# Patient Record
Sex: MALE | Race: WHITE | Employment: UNEMPLOYED | URBAN - METROPOLITAN AREA
[De-identification: names, ages, dates, MRNs, and addresses within clinical notes are randomized per-mention and may not be internally consistent; named-entity substitution may affect disease eponyms.]

---

## 2019-05-30 ENCOUNTER — TELEPHONE (OUTPATIENT)
Dept: CARDIAC CATH/INVASIVE PROCEDURES | Age: 74
End: 2019-05-30

## 2019-05-31 ENCOUNTER — HOSPITAL ENCOUNTER (OUTPATIENT)
Dept: CARDIAC CATH/INVASIVE PROCEDURES | Age: 74
Setting detail: OBSERVATION
Discharge: HOME | DRG: 247 | End: 2019-06-01
Attending: FAMILY MEDICINE | Admitting: STUDENT IN AN ORGANIZED HEALTH CARE EDUCATION/TRAINING PROGRAM
Payer: MEDICARE

## 2019-05-31 DIAGNOSIS — I25.10 CAD IN NATIVE ARTERY: ICD-10-CM

## 2019-05-31 PROBLEM — I10 HTN (HYPERTENSION): Status: ACTIVE | Noted: 2019-05-31

## 2019-05-31 PROBLEM — Z95.5 S/P CORONARY ARTERY STENT PLACEMENT: Status: ACTIVE | Noted: 2019-05-31

## 2019-05-31 PROBLEM — Z87.891 EX-SMOKER: Status: ACTIVE | Noted: 2019-05-31

## 2019-05-31 PROBLEM — E78.5 HLD (HYPERLIPIDEMIA): Status: ACTIVE | Noted: 2019-05-31

## 2019-05-31 LAB
ABO/RH: NORMAL
ANTIBODY SCREEN: NORMAL
POC ACT LR: 241 SECONDS
POC ACT LR: 269 SECONDS

## 2019-05-31 PROCEDURE — 36415 COLL VENOUS BLD VENIPUNCTURE: CPT

## 2019-05-31 PROCEDURE — 2709999900 HC NON-CHARGEABLE SUPPLY

## 2019-05-31 PROCEDURE — C1769 GUIDE WIRE: HCPCS

## 2019-05-31 PROCEDURE — 4A023N7 MEASUREMENT OF CARDIAC SAMPLING AND PRESSURE, LEFT HEART, PERCUTANEOUS APPROACH: ICD-10-PCS | Performed by: INTERNAL MEDICINE

## 2019-05-31 PROCEDURE — C1887 CATHETER, GUIDING: HCPCS

## 2019-05-31 PROCEDURE — 027034Z DILATION OF CORONARY ARTERY, ONE ARTERY WITH DRUG-ELUTING INTRALUMINAL DEVICE, PERCUTANEOUS APPROACH: ICD-10-PCS | Performed by: INTERNAL MEDICINE

## 2019-05-31 PROCEDURE — 86901 BLOOD TYPING SEROLOGIC RH(D): CPT

## 2019-05-31 PROCEDURE — C9600 PERC DRUG-EL COR STENT SING: HCPCS | Performed by: INTERNAL MEDICINE

## 2019-05-31 PROCEDURE — 6370000000 HC RX 637 (ALT 250 FOR IP): Performed by: INTERNAL MEDICINE

## 2019-05-31 PROCEDURE — 86900 BLOOD TYPING SEROLOGIC ABO: CPT

## 2019-05-31 PROCEDURE — 85347 COAGULATION TIME ACTIVATED: CPT

## 2019-05-31 PROCEDURE — 2580000003 HC RX 258: Performed by: INTERNAL MEDICINE

## 2019-05-31 PROCEDURE — B2151ZZ FLUOROSCOPY OF LEFT HEART USING LOW OSMOLAR CONTRAST: ICD-10-PCS | Performed by: INTERNAL MEDICINE

## 2019-05-31 PROCEDURE — C1874 STENT, COATED/COV W/DEL SYS: HCPCS

## 2019-05-31 PROCEDURE — 6370000000 HC RX 637 (ALT 250 FOR IP)

## 2019-05-31 PROCEDURE — 86850 RBC ANTIBODY SCREEN: CPT

## 2019-05-31 PROCEDURE — 93454 CORONARY ARTERY ANGIO S&I: CPT | Performed by: INTERNAL MEDICINE

## 2019-05-31 PROCEDURE — 92928 PRQ TCAT PLMT NTRAC ST 1 LES: CPT | Performed by: INTERNAL MEDICINE

## 2019-05-31 PROCEDURE — 6360000002 HC RX W HCPCS

## 2019-05-31 PROCEDURE — C1894 INTRO/SHEATH, NON-LASER: HCPCS

## 2019-05-31 PROCEDURE — 2140000000 HC CCU INTERMEDIATE R&B

## 2019-05-31 PROCEDURE — 93458 L HRT ARTERY/VENTRICLE ANGIO: CPT | Performed by: INTERNAL MEDICINE

## 2019-05-31 PROCEDURE — 93005 ELECTROCARDIOGRAM TRACING: CPT | Performed by: INTERNAL MEDICINE

## 2019-05-31 PROCEDURE — C1725 CATH, TRANSLUMIN NON-LASER: HCPCS

## 2019-05-31 PROCEDURE — G0378 HOSPITAL OBSERVATION PER HR: HCPCS

## 2019-05-31 PROCEDURE — B2111ZZ FLUOROSCOPY OF MULTIPLE CORONARY ARTERIES USING LOW OSMOLAR CONTRAST: ICD-10-PCS | Performed by: INTERNAL MEDICINE

## 2019-05-31 PROCEDURE — 2500000003 HC RX 250 WO HCPCS

## 2019-05-31 RX ORDER — RANOLAZINE 500 MG/1
500 TABLET, EXTENDED RELEASE ORAL 2 TIMES DAILY
Status: DISCONTINUED | OUTPATIENT
Start: 2019-05-31 | End: 2019-06-01 | Stop reason: HOSPADM

## 2019-05-31 RX ORDER — ASPIRIN 81 MG/1
81 TABLET ORAL DAILY
COMMUNITY

## 2019-05-31 RX ORDER — CARVEDILOL 3.12 MG/1
3.12 TABLET ORAL 2 TIMES DAILY WITH MEALS
Status: DISCONTINUED | OUTPATIENT
Start: 2019-05-31 | End: 2019-06-01 | Stop reason: HOSPADM

## 2019-05-31 RX ORDER — ATORVASTATIN CALCIUM 40 MG/1
40 TABLET, FILM COATED ORAL DAILY
COMMUNITY

## 2019-05-31 RX ORDER — CARVEDILOL 3.12 MG/1
3.12 TABLET ORAL 2 TIMES DAILY WITH MEALS
COMMUNITY

## 2019-05-31 RX ORDER — ONDANSETRON 2 MG/ML
4 INJECTION INTRAMUSCULAR; INTRAVENOUS EVERY 6 HOURS PRN
Status: DISCONTINUED | OUTPATIENT
Start: 2019-05-31 | End: 2019-06-01 | Stop reason: HOSPADM

## 2019-05-31 RX ORDER — NITROGLYCERIN 0.4 MG/1
0.4 TABLET SUBLINGUAL EVERY 5 MIN PRN
Status: DISCONTINUED | OUTPATIENT
Start: 2019-05-31 | End: 2019-06-01 | Stop reason: HOSPADM

## 2019-05-31 RX ORDER — SODIUM CHLORIDE 0.9 % (FLUSH) 0.9 %
10 SYRINGE (ML) INJECTION EVERY 12 HOURS SCHEDULED
Status: DISCONTINUED | OUTPATIENT
Start: 2019-05-31 | End: 2019-06-01 | Stop reason: HOSPADM

## 2019-05-31 RX ORDER — NITROGLYCERIN 0.4 MG/1
0.4 TABLET SUBLINGUAL EVERY 5 MIN PRN
COMMUNITY

## 2019-05-31 RX ORDER — ACETAMINOPHEN 325 MG/1
650 TABLET ORAL EVERY 4 HOURS PRN
Status: DISCONTINUED | OUTPATIENT
Start: 2019-05-31 | End: 2019-06-01 | Stop reason: HOSPADM

## 2019-05-31 RX ORDER — AMLODIPINE BESYLATE 5 MG/1
5 TABLET ORAL DAILY
COMMUNITY

## 2019-05-31 RX ORDER — SODIUM CHLORIDE 0.9 % (FLUSH) 0.9 %
10 SYRINGE (ML) INJECTION PRN
Status: DISCONTINUED | OUTPATIENT
Start: 2019-05-31 | End: 2019-06-01 | Stop reason: HOSPADM

## 2019-05-31 RX ORDER — ASPIRIN 81 MG/1
81 TABLET ORAL DAILY
Status: DISCONTINUED | OUTPATIENT
Start: 2019-06-01 | End: 2019-06-01 | Stop reason: HOSPADM

## 2019-05-31 RX ORDER — SODIUM CHLORIDE 9 MG/ML
INJECTION, SOLUTION INTRAVENOUS CONTINUOUS
Status: DISCONTINUED | OUTPATIENT
Start: 2019-05-31 | End: 2019-06-01

## 2019-05-31 RX ORDER — SODIUM CHLORIDE 9 MG/ML
INJECTION, SOLUTION INTRAVENOUS ONCE
Status: COMPLETED | OUTPATIENT
Start: 2019-05-31 | End: 2019-05-31

## 2019-05-31 RX ORDER — RANOLAZINE 500 MG/1
500 TABLET, EXTENDED RELEASE ORAL 2 TIMES DAILY
COMMUNITY

## 2019-05-31 RX ORDER — ATORVASTATIN CALCIUM 40 MG/1
40 TABLET, FILM COATED ORAL DAILY
Status: DISCONTINUED | OUTPATIENT
Start: 2019-05-31 | End: 2019-06-01 | Stop reason: HOSPADM

## 2019-05-31 RX ORDER — FAMOTIDINE 20 MG/1
20 TABLET, FILM COATED ORAL 2 TIMES DAILY
Status: DISCONTINUED | OUTPATIENT
Start: 2019-05-31 | End: 2019-06-01 | Stop reason: HOSPADM

## 2019-05-31 RX ORDER — AMLODIPINE BESYLATE 5 MG/1
5 TABLET ORAL DAILY
Status: DISCONTINUED | OUTPATIENT
Start: 2019-05-31 | End: 2019-06-01 | Stop reason: HOSPADM

## 2019-05-31 RX ADMIN — RANOLAZINE 500 MG: 500 TABLET, FILM COATED, EXTENDED RELEASE ORAL at 20:23

## 2019-05-31 RX ADMIN — SODIUM CHLORIDE: 9 INJECTION, SOLUTION INTRAVENOUS at 09:35

## 2019-05-31 RX ADMIN — CARVEDILOL 3.12 MG: 3.12 TABLET, FILM COATED ORAL at 16:14

## 2019-05-31 RX ADMIN — Medication 10 ML: at 20:23

## 2019-05-31 RX ADMIN — ATORVASTATIN CALCIUM 40 MG: 40 TABLET, FILM COATED ORAL at 20:24

## 2019-05-31 RX ADMIN — FAMOTIDINE 20 MG: 20 TABLET ORAL at 20:24

## 2019-05-31 RX ADMIN — SODIUM CHLORIDE: 9 INJECTION, SOLUTION INTRAVENOUS at 09:34

## 2019-05-31 RX ADMIN — SODIUM CHLORIDE: 9 INJECTION, SOLUTION INTRAVENOUS at 14:46

## 2019-05-31 RX ADMIN — AMLODIPINE BESYLATE 5 MG: 5 TABLET ORAL at 16:14

## 2019-05-31 ASSESSMENT — PAIN SCALES - GENERAL
PAINLEVEL_OUTOF10: 0
PAINLEVEL_OUTOF10: 0

## 2019-05-31 NOTE — H&P
Hospital Medicine History & Physical      PCP: Alexandra Garcia, PARIS - CNP    Date of Admission: 5/31/2019    Date of Service: Pt seen/examined on 05/31/19  and Admitted to Inpatient with expected LOS greater than two midnights due to medical therapy. Chief Complaint:  Post cath     History Of Present Illness:  Records reviewed. This is a  68 y.o. male who presented to [address] [place] [address] with post cath. PMH of CAD with previous stent, HTN, HLD. He presented for outpatient cath today and had 2 stents placed in LAD and RCA. He was admitted to [facility] post cath. He is doing well without chest pain. Wife is at bedside. Past Medical History:          Diagnosis Date    CAD (coronary artery disease)     Hyperlipidemia     Hypertension     Stented coronary artery 2014       Past Surgical History:          Procedure Laterality Date    CORONARY ANGIOPLASTY WITH STENT PLACEMENT  05/31/2019    Dr. Radhika Hennessy- Resolute [place] JOHN 3.5x12 Prox-mid RCA, Resolute Matt JOHN 2.0x26 Diagonal    FRACTURE SURGERY         Medications Prior to Admission:      Prior to Admission medications    Medication Sig Start Date End Date Taking? Authorizing Provider   carvedilol (COREG) 3.125 MG tablet Take 3.125 mg by mouth 2 times daily (with meals)   Yes Historical Provider, MD   atorvastatin (LIPITOR) 40 MG tablet Take 40 mg by mouth daily Indications: takes a half tablet daily   Yes Historical Provider, MD   amLODIPine (NORVASC) 5 MG tablet Take 5 mg by mouth daily   Yes Historical Provider, MD   ranolazine (RANEXA) 500 MG extended release tablet Take 500 mg by mouth 2 times daily   Yes Historical Provider, MD   aspirin EC 81 MG EC tablet Take 81 mg by mouth daily   Yes Historical Provider, MD   nitroGLYCERIN (NITROSTAT) 0.4 MG SL tablet Place 0.4 mg under the tongue every 5 minutes as needed for Chest pain up to max of 3 total doses. If no relief after 1 dose, call 911.     Historical Provider, MD       Allergies:  Lisinopril and Losartan    Social History:      The patient currently lives home     TOBACCO:   reports that he has quit smoking. He has never used smokeless tobacco.  ETOH:   has no alcohol history on file. REVIEW OF SYSTEMS:   Pertinent positives as noted in the HPI. All other systems reviewed and negative. PHYSICAL EXAM:    BP (!) 143/81   Pulse 81   Temp 97.9 °F (36.6 °C) (Temporal)   Resp 18   Ht 6' (1.829 m)   Wt 215 lb (97.5 kg)   SpO2 96%   BMI 29.16 kg/m²     General appearance:  No apparent distress, appears stated age and cooperative. HEENT:  Normal cephalic, atraumatic without obvious deformity. Pupils equal, round, and reactive to light. Extra ocular muscles intact. Conjunctivae/corneas clear. Neck: Supple, with full range of motion. No jugular venous distention. Trachea midline. Respiratory:  Normal respiratory effort. Clear to auscultation, bilaterally without Rales/Wheezes/Rhonchi. Cardiovascular:  Regular rate and rhythm with normal S1/S2 without murmurs, rubs or gallops. Abdomen: Soft, non-tender, non-distended with normal bowel sounds. Musculoskeletal:  No clubbing, cyanosis or edema bilaterally. Full range of motion without deformity. Skin: Skin color, texture, turgor normal.  No rashes or lesions. Neurologic:  Neurovascularly intact without any focal sensory/motor deficits. Psychiatric:  Alert and oriented, thought content appropriate, normal insight  Capillary Refill: Brisk,< 3 seconds   Peripheral Pulses: +2 palpable, equal bilaterally       Left Heart Catheterization 05/31/19    Findings:   Left main: 30% stenosis  LAD: 50 %   Stenosis. 99% stenosis of diagonal branch  Circumflex: 50%   stenosis. RCA: Dominant. 90 %  stenosis. LV angio: not performed  Interventional procedure:   1. PCI vessel: RCA. Lesion type: B. MELVIN III flow pre PCI. Angioplasty performed with 3.0 mm balloon. Stenting performed with 3.5 mm JOHN Post dilated with 4.0 mm non compliant balloon .    Result: 0% residual stenosis and MELVIN III distal flow.      2. PCI vessel:  LAD-Diag. Lesion type C  MELVIN II flow pre PCI  Angioplasty performed with 2.0 mm balloon  Stent with 2.0 mm JOHN. Result: 0% residual stenosis with MELVIN III distal flow. Labs:     No results for input(s): WBC, HGB, HCT, PLT in the last 72 hours. No results for input(s): NA, K, CL, CO2, BUN, CREATININE, CALCIUM, PHOS in the last 72 hours. Invalid input(s): MAGNES  No results for input(s): AST, ALT, BILIDIR, BILITOT, ALKPHOS in the last 72 hours. No results for input(s): INR in the last 72 hours. No results for input(s): Floreen Pill in the last 72 hours. Urinalysis:    No results found for: Magalene Edward, BACTERIA, RBCUA, BLOODU, SPECGRAV, GLUCOSEU      ASSESSMENT:    Active Hospital Problems    Diagnosis Date Noted    CAD in native artery [I25.10] 05/31/2019    S/P coronary artery stent placement [Z95.5] 05/31/2019    HTN (hypertension) [I10] 05/31/2019    HLD (hyperlipidemia) [E78.5] 05/31/2019    Ex-smoker [Z87.891] 05/31/2019       PLAN:    Continue DAPT per cardiology   Cath recovery   Monitor tele   Monitor BP  likely discharge in am     DVT Prophylaxis: ambulate and DAPT  Diet: DIET CARDIAC;  Code Status: Full Code    PT/OT Eval Status: cardiac rehab     Dispo - tele admit with discharge in am        PARIS Shelton CNP    Thank you PARIS Patton CNP for the opportunity to be involved in this patient's care. If you have any questions or concerns please feel free to contact me at [phone]     [facility] Attending Note    Porfirio Snider is a 68 y.o.   male who was seen and examined independently by myself and, after review,  I agree with the history, physical, and assessment documented by the mid-level above. Physical exam:  Cardiovascular: heart rate normal and normal sinus rhythm  Respiratory: Lungs clear to auscultation bilaterally without wheeze, rales or rhonchi throughout all fields. The remainder of the physical exam is as dictated above. Assessment/Plan  · Agree with the plan, CAD, cardio following, continue DAPT, monitor labs and vitals and for hematoma. Dinorah Ferro MD  [employer]  Please contact me via [employer]    NOTE: This report was transcribed using voice recognition software. Every effort was made to ensure accuracy; however, inadvertent computerized transcription errors may be present.

## 2019-05-31 NOTE — PROCEDURES
[address]                  [address] [place],  [facility]                            CARDIAC CATHETERIZATION    PATIENT NAME: Lukas Mast                      :        1945  MED REC NO:   [id number]                            ROOM:       6316  ACCOUNT NO:   [de-identified]                           ADMIT DATE: 2019  PROVIDER:     Cheryl Almaraz MD    DATE OF PROCEDURE:  2019    CARDIAC CATHETERIZATION AND ANGIOPLASTY REPORT    PROCEDURES PERFORMED:  Left heart catheterization, selective coronary  angiography, left ventriculography, balloon angioplasty with deployment  of a drug-eluting coronary stent to the proximal right coronary artery  with post-stent deployment dilatation with a large noncompliant balloon  and balloon angioplasty with deployment of drug-eluting coronary stent  to the diagonal branch of the left anterior descending artery with  adjunctive heparin therapy. The procedure was done through the right radial approach. The patient received intravenous Versed and intravenous fentanyl for  sedation. INDICATION:  Worsening angina in a patient with known coronary artery  disease. PRESSURES:  Aorta 127/75 with a mean of 94. CORONARY ANGIOGRAPHY:  LEFT MAIN:  The left main artery has around 30% distal vessel narrowing  before it divided into the left anterior descending artery branch and  the left circumflex. LAD:  The left anterior descending artery is a large vessel that wraps  around the left ventricular apex. It has a stent in its proximal  segment with luminal irregularities within the stent, but with no  significant angiographic luminal narrowing with less than 30% in-stent  restenosis. At the distal edge of the stent, there is around 40% to 50%  eccentric luminal narrowing in the mid LAD.   After the stent in the  proximal to mid vessel and after the lesion at the distal edge of the  stent, the rest of the LAD did not appear to have any significant  disease. A moderate-size first diagonal branch had 99% subtotal  occlusion in its proximal third. LCX:  The left circumflex is a nondominant vessel. It gives rise to a  large first obtuse marginal branch, which itself has around 50%  narrowing in its proximal segment. RCA:  The right coronary artery is a dominant vessel. It has an  eccentric 90% proximal vessel stenosis. There are luminal  irregularities in the mid and distal vessel with less than 50%  angiographic luminal narrowings. INTERVENTIONAL PROCEDURE:  EQUIPMENTS:  1. A 6-Slovak JR-4 Medtronic Launcher guide catheter with side holes. 2.  A 0.014/300 [employer] ChoICE Extra Support J exchange wire. 3.  A 3.0 mm x 12 mm [employer] balloon. 4.  A 3.5 mm x 12 mm [place] Resolute drug-eluting coronary stent. 5.  A 4.0 mm x 8 mm [employer] noncompliant balloon. 6.  A 6-Slovak JL3.5 Medtronic Launcher guide catheter. 7.  A 2.0 x 8 mm [employer] balloon. 8.  A 2.0 mm x 26 mm [employer] [place] Resolute drug-eluting coronary  stent. TECHNIQUE:  The patient received 5000 units of intravenous heparin at  the beginning of the diagnostic procedure. An ACT prior to the  interventional procedure was 241. He was given 3000 units of heparin  prior to the beginning of the interventional procedure. A repeat ACT  was 269, during the procedure for which he was given another 2000 units  of intravenous heparin. He was given 180 mg of Brilinta to swallow at  the beginning of the interventional procedure. Intravenous Versed and  intravenous fentanyl were given for sedation. First, intervention on the right coronary artery was performed. The  right coronary artery was selectively engaged with 6-Slovak JR-4 guide  catheter with side holes.   The Extra Support exchange wire was then  advanced through the 90% stenosis in the proximal RCA and further  distant into the

## 2019-06-01 VITALS
TEMPERATURE: 98.2 F | DIASTOLIC BLOOD PRESSURE: 76 MMHG | SYSTOLIC BLOOD PRESSURE: 156 MMHG | RESPIRATION RATE: 18 BRPM | BODY MASS INDEX: 29.12 KG/M2 | OXYGEN SATURATION: 96 % | HEART RATE: 76 BPM | HEIGHT: 72 IN | WEIGHT: 215 LBS

## 2019-06-01 LAB
ANION GAP SERPL CALCULATED.3IONS-SCNC: 11 MMOL/L (ref 7–16)
BUN BLDV-MCNC: 15 MG/DL (ref 8–23)
CALCIUM SERPL-MCNC: 9.6 MG/DL (ref 8.6–10.2)
CHLORIDE BLD-SCNC: 102 MMOL/L (ref 98–107)
CHOLESTEROL, TOTAL: 162 MG/DL (ref 0–199)
CO2: 26 MMOL/L (ref 22–29)
CREAT SERPL-MCNC: 1.1 MG/DL (ref 0.7–1.2)
EKG ATRIAL RATE: 68 BPM
EKG ATRIAL RATE: 71 BPM
EKG P AXIS: 105 DEGREES
EKG P AXIS: 52 DEGREES
EKG P-R INTERVAL: 176 MS
EKG P-R INTERVAL: 176 MS
EKG Q-T INTERVAL: 410 MS
EKG Q-T INTERVAL: 412 MS
EKG QRS DURATION: 82 MS
EKG QRS DURATION: 86 MS
EKG QTC CALCULATION (BAZETT): 438 MS
EKG QTC CALCULATION (BAZETT): 445 MS
EKG R AXIS: -23 DEGREES
EKG R AXIS: -23 DEGREES
EKG T AXIS: 31 DEGREES
EKG T AXIS: 36 DEGREES
EKG VENTRICULAR RATE: 68 BPM
EKG VENTRICULAR RATE: 71 BPM
GFR AFRICAN AMERICAN: >60
GFR NON-AFRICAN AMERICAN: >60 ML/MIN/1.73
GLUCOSE BLD-MCNC: 117 MG/DL (ref 74–99)
HDLC SERPL-MCNC: 32 MG/DL
LDL CHOLESTEROL CALCULATED: 99 MG/DL (ref 0–99)
POTASSIUM REFLEX MAGNESIUM: 4.5 MMOL/L (ref 3.5–5)
SODIUM BLD-SCNC: 139 MMOL/L (ref 132–146)
TRIGL SERPL-MCNC: 153 MG/DL (ref 0–149)
VLDLC SERPL CALC-MCNC: 31 MG/DL

## 2019-06-01 PROCEDURE — 93005 ELECTROCARDIOGRAM TRACING: CPT | Performed by: INTERNAL MEDICINE

## 2019-06-01 PROCEDURE — 93010 ELECTROCARDIOGRAM REPORT: CPT | Performed by: INTERNAL MEDICINE

## 2019-06-01 PROCEDURE — 6370000000 HC RX 637 (ALT 250 FOR IP): Performed by: INTERNAL MEDICINE

## 2019-06-01 PROCEDURE — 80048 BASIC METABOLIC PNL TOTAL CA: CPT

## 2019-06-01 PROCEDURE — 80061 LIPID PANEL: CPT

## 2019-06-01 PROCEDURE — 36415 COLL VENOUS BLD VENIPUNCTURE: CPT

## 2019-06-01 PROCEDURE — 2580000003 HC RX 258: Performed by: INTERNAL MEDICINE

## 2019-06-01 PROCEDURE — G0378 HOSPITAL OBSERVATION PER HR: HCPCS

## 2019-06-01 RX ADMIN — FAMOTIDINE 20 MG: 20 TABLET ORAL at 08:34

## 2019-06-01 RX ADMIN — AMLODIPINE BESYLATE 5 MG: 5 TABLET ORAL at 08:34

## 2019-06-01 RX ADMIN — ASPIRIN 81 MG: 81 TABLET ORAL at 08:34

## 2019-06-01 RX ADMIN — RANOLAZINE 500 MG: 500 TABLET, FILM COATED, EXTENDED RELEASE ORAL at 08:34

## 2019-06-01 RX ADMIN — CARVEDILOL 3.12 MG: 3.12 TABLET, FILM COATED ORAL at 08:34

## 2019-06-01 RX ADMIN — Medication 10 ML: at 08:35

## 2019-06-01 RX ADMIN — TICAGRELOR 90 MG: 90 TABLET ORAL at 08:34

## 2019-06-01 ASSESSMENT — PAIN SCALES - GENERAL
PAINLEVEL_OUTOF10: 0
PAINLEVEL_OUTOF10: 0

## 2019-06-01 NOTE — DISCHARGE INSTR - COC
Continuity of Care Form    Patient Name: Tabatha Najera   :    MRN:  [id number]    Admit date:  2019  Discharge date:  ***    Code Status Order: Full Code   Advance Directives:   Advance Care Flowsheet Documentation     Date/Time Healthcare Directive Type of Healthcare Directive Copy in [address] Agent's Name Healthcare Agent's Phone Number    19 2802  Yes, patient has an advance directive for healthcare treatment  Durable power of  for health care;Living will  No, copy requested from family  Spouse  Mrs Jenise Bender  --    19 [phone]  Yes, patient has an advance directive for healthcare treatment  Durable power of  for health care;Living will  --  --  --  --          Admitting Physician:  Edyta Horner MD  PCP: PARIS Sewell CNP    Discharging Nurse: [facility] Unit/Room#: 6316/6316-A  Discharging Unit Phone Number: ***    Emergency Contact:   Extended Emergency Contact Information  Primary Emergency Contact: [address] [place] [address] Phone: [phone]  Relation: Spouse  Secondary Emergency Contact: Gene Loza [address] Phone: [phone]  Relation: Brother/Sister    Past Surgical History:  Past Surgical History:   Procedure Laterality Date    CORONARY ANGIOPLASTY WITH STENT PLACEMENT  2019    Dr. Radhika Hennessy- Resolute [place] JOHN 3.5x12 Prox-mid RCA, Resolute [place] JOHN 2.0x26 Diagonal    FRACTURE SURGERY         Immunization History: There is no immunization history on file for this patient.     Active Problems:  Patient Active Problem List   Diagnosis Code    CAD in native artery I25.10    S/P coronary artery stent placement Z95.5    HTN (hypertension) I10    HLD (hyperlipidemia) E78.5    Ex-smoker Z87.891       Isolation/Infection:   Isolation          No Isolation            Nurse Assessment:  Last Vital Signs: BP (!) 154/76   Pulse 74   Temp 98.1 °F (36.7 °C) (Temporal)   Resp 14   Ht 6' (1.829 m)   Wt 215 lb (97.5 kg)   SpO2 97%   BMI 29.16 kg/m²     Last documented pain score (0-10 scale): Pain Level: 0  Last Weight:   Wt Readings from Last 1 Encounters:   19 215 lb (97.5 kg)     Mental Status:  {IP PT MENTAL STATUS:21502}    IV Access:  { GREG IV ACCESS:280553733}    Nursing Mobility/ADLs:  Walking   {CHP DME DXAN:870202825}  Transfer  {CHP DME TIAN:259805484}  Bathing  {CHP DME JTZA:548967278}  Dressing  {CHP DME SNSL:904562869}  Toileting  {CHP DME KBBO:371685206}  Feeding  {CHP DME XWWR:153459765}  Med Admin  {CHP DME ZAAW:402831010}  Med Delivery   { GREG MED Delivery:666303998}    Wound Care Documentation and Therapy:        Elimination:  Continence:   · Bowel: {YES / AP:69686}  · Bladder: {YES / FP:17502}  Urinary Catheter: {Urinary Catheter:398323303}   Colostomy/Ileostomy/Ileal Conduit: {YES / VD:00037}       Date of Last BM: ***    Intake/Output Summary (Last 24 hours) at 2019 0128  Last data filed at 2019 2251  Gross per 24 hour   Intake  ml   Output 625 ml   Net 1397 ml     I/O last 3 completed shifts:   In:  [P.O.:1080; I.V.:942]  Out: 625 [Urine:625]    Safety Concerns:     [employer] Safety Concerns:342501368}    Impairments/Disabilities:      [employer] Impairments/Disabilities:006588162}    Nutrition Therapy:  Current Nutrition Therapy:   [employer] Diet List:712791851}    Routes of Feeding: {P DME Other Feedings:723826064}  Liquids: {Slp liquid thickness:79015}  Daily Fluid Restriction: {CHP DME Yes amt example:923850413}  Last Modified Barium Swallow with Video (Video Swallowing Test): {Done Not Done BCTE:360134577}    Treatments at the Time of Hospital Discharge:   Respiratory Treatments: ***  Oxygen Therapy:  {Therapy; copd oxygen:71801}  Ventilator:    {HARDY PAT Vent ZVSO:227913978}    Rehab Therapies: {THERAPEUTIC INTERVENTION:6713140781}  Weight Bearing Status/Restrictions: {HARDY PAT Weight Bearin}  Other Medical Equipment (for information only, NOT a DME order):  {EQUIPMENT:802887104}  Other Treatments: ***    Patient's personal belongings (please select all that are sent with patient):  {CHP DME Belongings:318469523}    RN SIGNATURE:  {Esignature:355180672}    CASE MANAGEMENT/SOCIAL WORK SECTION    Inpatient Status Date: ***    Readmission Risk Assessment Score:  Readmission Risk              Risk of Unplanned Readmission:        7           Discharging to Facility/ Agency   · Name:   · Address:  · Phone:  · Fax:    Dialysis Facility (if applicable)   · Name:  · Address:  · Dialysis Schedule:  · Phone:  · Fax:    / signature: {Esignature:716780523}    PHYSICIAN SECTION    Prognosis: {Prognosis:2585022212}    Condition at Discharge: [address] [place] [address] Patient Condition:295398408}    Rehab Potential (if transferring to Rehab): {Prognosis:0927122828}    Recommended Labs or Other Treatments After Discharge: ***    Physician Certification: I certify the above information and transfer of Jin Gonzales  is necessary for the continuing treatment of the diagnosis listed and that he requires {Admit to Appropriate Level of Care:40563} for {GREATER/LESS:470104797} 30 days.      Update Admission H&P: {CHP DME Changes in FAHEB:559825410}    PHYSICIAN SIGNATURE:  {Esignature:294551512}

## 2019-06-01 NOTE — DISCHARGE INSTR - DIET
how often you eat them, but do not cut them out entirely. · Eat a wide variety of foods. Make healthy choices when eating out  · The type of restaurant you choose can help you make healthy choices. Even fast-food chains are now offering more low-fat or healthier choices on the menu. · Choose smaller portions, or take half of your meal home. · When eating out, try:  ? A veggie pizza with a whole wheat crust or grilled chicken (instead of sausage or pepperoni). ? Pasta with roasted vegetables, grilled chicken, or marinara sauce instead of cream sauce. ? A vegetable wrap or grilled chicken wrap. ? Broiled or poached food instead of fried or breaded items. Make healthy choices easy  · Buy packaged, prewashed, ready-to-eat fresh vegetables and fruits, such as baby carrots, salad mixes, and chopped or shredded broccoli and cauliflower. · Buy packaged, presliced fruits, such as melon or pineapple. · Choose 100% fruit or vegetable juice instead of soda. Limit juice intake to 4 to 6 oz (½ to ¾ cup) a day. · Blend low-fat yogurt, fruit juice, and canned or frozen fruit to make a smoothie for breakfast or a snack. Where can you learn more? Go to https://[employer] org and sign in to your Mobile Experience account. Enter I925 in the [facility] box to learn more about \"Eating Healthy Foods: Care Instructions. \"     If you do not have an account, please click on the \"Sign Up Now\" link. Current as of: November 7, 2018  Content Version: 12.0  © 1701-2811 Healthwise, Incorporated. Care instructions adapted under license by [facility] ([facility]). If you have questions about a medical condition or this instruction, always ask your healthcare professional. [address] any warranty or liability for your use of this information.

## 2019-06-01 NOTE — PLAN OF CARE
Question as to pain, location, radiation and relief after analgesic given. Ask patient to rate pain on a scale of 0-10. Have patient describe where the pain is and how intense it is, does it radiate and does the pain medication prescribed work? Keep wound / puncture site clean and dry. Remove dressing within 24 hours if a groin site and the next am if a chest site or right radial wrist site. Keep open to air, or cover with Band-Aid until healed on a daily basis. Note any drainage, type, color, odor  Check temperature for fever. Talked about cleaning with dial soap, gently, pat dry, air dry -   Watch for signs of infection, call  If one is suspected.

## 2019-06-01 NOTE — PROGRESS NOTES
CARDIOLOGY PROGRESS NOTE  [facility]        Subjective:  [facility] cardiology seeing patient for CAD status post stent to the RCA and diagonal yesterday with prior history of LAD stent 5 years ago, chronic hypertension, hyperlipidemia and right bundle branch block. He denies any chest pain shortness of breath or distress overnight. No heart failure symptoms. His wife is at bedside throughout the interview. He denies any problems from the right wrist heart catheterization site. Objective: Labs chart medications and telemetry all reviewed. Patient Vitals for the past 24 hrs:   BP Temp Temp src Pulse Resp SpO2   06/01/19 0830 (!) 156/76 98.2 °F (36.8 °C) Temporal 76 18 96 %   06/01/19 0400 125/72 98.1 °F (36.7 °C) Temporal 72 16 --   05/31/19 2330 (!) 154/76 98.1 °F (36.7 °C) Temporal 74 14 --   05/31/19 2000 127/79 98.4 °F (36.9 °C) Temporal 84 14 97 %   05/31/19 1549 (!) 143/81 97.9 °F (36.6 °C) Temporal 81 18 96 %   05/31/19 1245 (!) 142/76 97.6 °F (36.4 °C) Temporal 68 18 96 %         Intake/Output Summary (Last 24 hours) at 6/1/2019 1145  Last data filed at 6/1/2019 0302  Gross per 24 hour   Intake 2022 ml   Output 1075 ml   Net 947 ml       Wt Readings from Last 3 Encounters:   05/31/19 215 lb (97.5 kg)       Telemetry: I personally reviewed and shows normal sinus rhythm heart rate in the 60s and 70s. Current meds: Scheduled Meds:   amLODIPine  5 mg Oral Daily    aspirin EC  81 mg Oral Daily    atorvastatin  40 mg Oral Daily    carvedilol  3.125 mg Oral BID WC    ranolazine  500 mg Oral BID    sodium chloride flush  10 mL Intravenous 2 times per day    famotidine  20 mg Oral BID    ticagrelor  90 mg Oral BID     Continuous Infusions:  PRN Meds:.nitroGLYCERIN, sodium chloride flush, acetaminophen, magnesium hydroxide, ondansetron    Allergies: Lisinopril and Losartan      Labs: No results for input(s): WBC, HGB, HCT, MCV, PLT in the last 72 hours.     Labs:   Recent Labs 06/01/19  0436      K 4.5      CO2 26   BUN 15   CREATININE 1.1       Labs: No results for input(s): CKTOTAL, CKMB, CKMBINDEX, TROPONINI in the last 72 hours. Labs: No results for input(s): BNP in the last 72 hours. Labs:   Recent Labs     06/01/19 0436   CHOL 162   HDL 32   TRIG 153*       Labs: No results for input(s): PROT, INR in the last 72 hours. Review of systems: No reported significant weight gain or weight loss. no dysuria or frequency, no dizziness, falls or trauma, no change in bowel or bladder habits, no hematochezia, hemoptysis or hematuria. No fevers, chills, nausea or vomiting reported. No significant wheezing or sputum production. No headache or visual changes. The remainder of the 10 review of systems otherwise negative. Exam      General: Patient comfortable in no distress and currently denies any chest pain. HEENT: Face symmetrical and no apparent cranial nerve deficit. Neck: No jugular venous distention, carotid bruit or thyromegaly. Lungs: Clear bilaterally without rales, wheezes or dullness. Cardiac: Regular rate and rhythm, no S3, S4, no rub or gallop. Abdomen: No rebound or guarding, no hepatosplenomegaly. Extremities: Without significant clubbing , cyanosis, or edema. Neuro:  No focal motor or sensory deficit apparent. Skin: No petechiae, no significant bruising. Assessment: See plan below        Plan: #1 CAD with recently progressive anginal symptoms. Medical management and dual antiplatelet therapy with Brilinta 90 mg twice a day to his medical regimen, carvedilol statin aspirin amlodipine. Post cath blood work this morning shows creatinine 1.1. Plan home this afternoon and follow-up my office one month [facility] cardiology. #2 history of prior LAD stent 5 years ago. Continue to modify cardiovascular risk factors. #3 hypertension and blood pressure reasonable on current medical regimen.     #4 hyperlipidemia and continue atorvastatin 40 mg daily. Today lipid profile total cholesterol 162, HDL 32, LDL 99 and triglycerides 153. Discussed following a low-sodium low-fat low carbohydrate diet. Weight loss and exercise regimen and encouraged him to come to cardiac rehab. Home this afternoon and follow-up my office in one month.         Electronically signed by Cecilia Chakraborty MD on 6/1/2019 at 11:45 AM

## 2019-06-01 NOTE — DISCHARGE SUMMARY
Hospital Medicine Discharge Summary    Patient ID: Gato Mchugh      Patient's PCP: PARIS Wright CNP    Admit Date: 5/31/2019     Discharge Date:  06/01/19     Admitting Physician: Tonya Rosario MD     Discharge Physician: PARIS Norris CNP     Discharge Diagnoses: Active Hospital Problems    Diagnosis Date Noted    CAD in native artery [I25.10] 05/31/2019    S/P coronary artery stent placement [Z95.5] 05/31/2019    HTN (hypertension) [I10] 05/31/2019    HLD (hyperlipidemia) [E78.5] 05/31/2019    Ex-smoker [Q20.253] 05/31/2019       The patient was seen and examined on day of discharge and this discharge summary is in conjunction with any daily progress note from day of discharge. Hospital Course: This is a  68 y.o. male who presented to [address] [place] [address] with post cath. PMH of CAD with previous stent, HTN, HLD. He presented for outpatient cath today and had 2 stents placed in LAD and RCA. He was admitted to [facility] post cath, well over night without issues. Discharged home in stable condition. Exam:     BP (!) 156/76   Pulse 76   Temp 98.2 °F (36.8 °C) (Temporal)   Resp 18   Ht 6' (1.829 m)   Wt 215 lb (97.5 kg)   SpO2 96%   BMI 29.16 kg/m²     General appearance: No apparent distress, appears stated age and cooperative. HEENT: Pupils equal, round, and reactive to light. Conjunctivae/corneas clear. Neck: Supple, with full range of motion. No jugular venous distention. Trachea midline. Respiratory:  Normal respiratory effort. Clear to auscultation, bilaterally without Rales/Wheezes/Rhonchi. Cardiovascular: Regular rate and rhythm with normal S1/S2 without murmurs, rubs or gallops. Abdomen: Soft, non-tender, non-distended with normal bowel sounds. Musculoskeletal: No clubbing, cyanosis or edema bilaterally. Full range of motion without deformity. Skin: Skin color, texture, turgor normal.  No rashes or lesions.   Neurologic:  Neurovascularly intact without any focal sensory/motor deficits. Psychiatric: Alert and oriented, thought content appropriate, normal insight      Consults:     IP CONSULT TO CARDIAC REHAB    Significant Diagnostic Studies:       Procedure:   Left Heart Catheterization, coronary angiography, left ventriculography, percutaneous coronary intervention to RCA and LAD-Diagonal branch    Findings:   Left main: 30% stenosis  LAD: 50 %   Stenosis. 99% stenosis of diagonal branch  Circumflex: 50%   stenosis. RCA: Dominant. 90 %  stenosis. LV angio: not performed  Interventional procedure:   1. PCI vessel: RCA. Lesion type: B. MELVIN III flow pre PCI. Angioplasty performed with 3.0 mm balloon. Stenting performed with 3.5 mm JOHN Post dilated with 4.0 mm non compliant balloon . Result: 0% residual stenosis and MELVIN III distal flow.    2. PCI vessel:  LAD-Diag. Lesion type C  MELVIN II flow pre PCI  Angioplasty performed with 2.0 mm balloon  Stent with 2.0 mm JOHN. Result: 0% residual stenosis with MELVIN III distal flow. Disposition:  Home     Discharge Instructions/Follow-up:    Continue DAPT  Follow up with PCP and cardiology    Code Status:  Full Code     Activity: activity as tolerated    Diet: cardiac diet    Labs:  For convenience and continuity at follow-up the following most recent labs are provided:      CBC:  No results found for: WBC, HGB, HCT, PLT    Renal:    Lab Results   Component Value Date     06/01/2019    K 4.5 06/01/2019     06/01/2019    CO2 26 06/01/2019    BUN 15 06/01/2019    CREATININE 1.1 06/01/2019    CALCIUM 9.6 06/01/2019       Discharge Medications:     Current Discharge Medication List           Details   ticagrelor (BRILINTA) 90 MG TABS tablet Take 1 tablet by mouth 2 times daily  Qty: 180 tablet, Refills: 2              Details   carvedilol (COREG) 3.125 MG tablet Take 3.125 mg by mouth 2 times daily (with meals)      atorvastatin (LIPITOR) 40 MG tablet Take 40 mg by mouth daily Indications: takes a half tablet daily      amLODIPine (NORVASC) 5 MG tablet Take 5 mg by mouth daily      ranolazine (RANEXA) 500 MG extended release tablet Take 500 mg by mouth 2 times daily      aspirin EC 81 MG EC tablet Take 81 mg by mouth daily      nitroGLYCERIN (NITROSTAT) 0.4 MG SL tablet Place 0.4 mg under the tongue every 5 minutes as needed for Chest pain up to max of 3 total doses. If no relief after 1 dose, call 911. Time Spent on discharge is more than 45 minutes in the examination, evaluation, counseling and review of medications and discharge plan. Signed:    PARIS Sanon - CNP   6/1/2019      Thank you PARIS Patton - BRIAN for the opportunity to be involved in this patient's care. If you have any questions or concerns please feel free to contact me at [phone].

## 2019-06-01 NOTE — PROGRESS NOTES
CLINICAL PHARMACY NOTE: MEDS TO [address] Select Patient?: No  Total # of Prescriptions Filled: 1   The following medications were delivered to the patient:  · brilinta 90  Total # of Interventions Completed: 3  Time Spent (min): 30    Additional Documentation:  ,

## 2023-01-10 ENCOUNTER — OFFICE VISIT (OUTPATIENT)
Dept: FAMILY MEDICINE CLINIC | Age: 78
End: 2023-01-10
Payer: MEDICARE

## 2023-01-10 VITALS
OXYGEN SATURATION: 96 % | SYSTOLIC BLOOD PRESSURE: 128 MMHG | WEIGHT: 215 LBS | DIASTOLIC BLOOD PRESSURE: 80 MMHG | RESPIRATION RATE: 20 BRPM | BODY MASS INDEX: 29.12 KG/M2 | TEMPERATURE: 97.4 F | HEIGHT: 72 IN | HEART RATE: 75 BPM

## 2023-01-10 DIAGNOSIS — J01.90 ACUTE NON-RECURRENT SINUSITIS, UNSPECIFIED LOCATION: Primary | ICD-10-CM

## 2023-01-10 PROCEDURE — 1123F ACP DISCUSS/DSCN MKR DOCD: CPT | Performed by: PHYSICIAN ASSISTANT

## 2023-01-10 PROCEDURE — 3074F SYST BP LT 130 MM HG: CPT | Performed by: PHYSICIAN ASSISTANT

## 2023-01-10 PROCEDURE — 3079F DIAST BP 80-89 MM HG: CPT | Performed by: PHYSICIAN ASSISTANT

## 2023-01-10 PROCEDURE — 99203 OFFICE O/P NEW LOW 30 MIN: CPT | Performed by: PHYSICIAN ASSISTANT

## 2023-01-10 RX ORDER — CARVEDILOL PHOSPHATE 40 MG/1
CAPSULE, EXTENDED RELEASE ORAL
COMMUNITY

## 2023-01-10 RX ORDER — HYDRALAZINE HYDROCHLORIDE 25 MG/1
TABLET, FILM COATED ORAL
COMMUNITY
Start: 2022-11-07

## 2023-01-10 RX ORDER — ATORVASTATIN CALCIUM 20 MG/1
TABLET, FILM COATED ORAL
COMMUNITY
Start: 2012-11-09

## 2023-01-10 RX ORDER — METHYLPREDNISOLONE 4 MG/1
TABLET ORAL
Qty: 1 KIT | Refills: 0 | Status: SHIPPED | OUTPATIENT
Start: 2023-01-10

## 2023-01-10 RX ORDER — CEFDINIR 300 MG/1
300 CAPSULE ORAL 2 TIMES DAILY
Qty: 20 CAPSULE | Refills: 0 | Status: SHIPPED | OUTPATIENT
Start: 2023-01-10 | End: 2023-01-20

## 2023-01-10 RX ORDER — AMLODIPINE BESYLATE 2.5 MG/1
TABLET ORAL
COMMUNITY

## 2023-01-10 RX ORDER — LEVOFLOXACIN 500 MG/1
500 TABLET, FILM COATED ORAL DAILY
Qty: 10 TABLET | Refills: 0 | Status: SHIPPED | OUTPATIENT
Start: 2023-01-10 | End: 2023-01-20

## 2023-01-10 ASSESSMENT — ENCOUNTER SYMPTOMS
SHORTNESS OF BREATH: 0
VOMITING: 0
PHOTOPHOBIA: 0
COUGH: 0
ABDOMINAL PAIN: 0
BACK PAIN: 0
DIARRHEA: 0
NAUSEA: 0
SINUS PRESSURE: 1
SORE THROAT: 0
SINUS PAIN: 1

## 2023-01-10 NOTE — PROGRESS NOTES
1/10/23  Betsy Rogers : 1945 Sex: male  Age 68 y.o. Subjective:  Chief Complaint   Patient presents with    Sinus Problem         27-year-old male with a history of CAD and hypertension presents to the walk-in clinic for evaluation of body aches, fatigue and chills that he states has been ongoing for 7-10 days. He also notes sinus congestion with pressure and pain. He has a little drainage. He denies cough. No fever, nausea or vomiting. He states his appetite is normal.  No shortness of breath or chest pain. He is taking over-the-counter Claritin. He states he walks at least 30 minutes every day. He states he has needed a nap but after 20 minutes feels recharged. Review of Systems   Constitutional:  Positive for chills and fatigue. Negative for fever. HENT:  Positive for congestion, sinus pressure and sinus pain. Negative for ear pain and sore throat. Eyes:  Negative for photophobia and visual disturbance. Respiratory:  Negative for cough and shortness of breath. Cardiovascular:  Negative for chest pain. Gastrointestinal:  Negative for abdominal pain, diarrhea, nausea and vomiting. Genitourinary:  Negative for difficulty urinating, dysuria, frequency and urgency. Musculoskeletal:  Positive for myalgias. Negative for back pain, neck pain and neck stiffness. Skin:  Negative for rash. Neurological:  Negative for dizziness, syncope, weakness, light-headedness and headaches. Hematological:  Negative for adenopathy. Does not bruise/bleed easily. Psychiatric/Behavioral:  Negative for agitation and confusion. All other systems reviewed and are negative.       PMH:     Past Medical History:   Diagnosis Date    CAD (coronary artery disease)     Hyperlipidemia     Hypertension     Stented coronary artery        Past Surgical History:   Procedure Laterality Date    CORONARY ANGIOPLASTY WITH STENT PLACEMENT  2019    Dr. Epperson Levo- Resolute [place] JOHN 3.5x12 Prox-mid RCA, Resolute [place] JOHN 2.0x26 Diagonal    FRACTURE SURGERY         History reviewed. No pertinent family history. Medications:     Current Outpatient Medications:     amLODIPine (NORVASC) 2.5 MG tablet, Take by mouth, Disp: , Rfl:     atorvastatin (LIPITOR) 20 MG tablet, Take by mouth, Disp: , Rfl:     hydrALAZINE (APRESOLINE) 25 MG tablet, TAKE 1 TABLET BY MOUTH TWICE DAILY, Disp: , Rfl:     carvedilol (COREG CR) 40 MG CP24 extended release capsule, , Disp: , Rfl:     cefdinir (OMNICEF) 300 MG capsule, Take 1 capsule by mouth 2 times daily for 10 days, Disp: 20 capsule, Rfl: 0    methylPREDNISolone (MEDROL DOSEPACK) 4 MG tablet, Take by mouth., Disp: 1 kit, Rfl: 0    ticagrelor (BRILINTA) 90 MG TABS tablet, Take 1 tablet by mouth 2 times daily, Disp: 180 tablet, Rfl: 2    carvedilol (COREG) 3.125 MG tablet, Take 3.125 mg by mouth 2 times daily (with meals), Disp: , Rfl:     atorvastatin (LIPITOR) 40 MG tablet, Take 40 mg by mouth daily Indications: takes a half tablet daily, Disp: , Rfl:     amLODIPine (NORVASC) 5 MG tablet, Take 5 mg by mouth daily, Disp: , Rfl:     ranolazine (RANEXA) 500 MG extended release tablet, Take 500 mg by mouth 2 times daily, Disp: , Rfl:     aspirin EC 81 MG EC tablet, Take 81 mg by mouth daily, Disp: , Rfl:     nitroGLYCERIN (NITROSTAT) 0.4 MG SL tablet, Place 0.4 mg under the tongue every 5 minutes as needed for Chest pain up to max of 3 total doses. If no relief after 1 dose, call 911., Disp: , Rfl:     Allergies: Allergies   Allergen Reactions    Amoxicillin-Pot Clavulanate      GI upset    Doxycycline Hyclate Other (See Comments)     Double vision    Lisinopril     Losartan        Social History:     Social History     Tobacco Use    Smoking status: Former    Smokeless tobacco: Never   Substance Use Topics    Drug use: Never       Patient lives at home.     Physical Exam:     Vitals:    01/10/23 0901   BP: 128/80   Pulse: 75   Resp: 20   Temp: 97.4 °F (36.3 °C)   TempSrc: Temporal   SpO2: 96%   Weight: 215 lb (97.5 kg)   Height: 6' (1.829 m)       Exam:  Physical Exam  Vitals and nursing note reviewed. Constitutional:       General: He is not in acute distress. Appearance: He is well-developed. HENT:      Head: Normocephalic and atraumatic. Comments: Patient has tenderness palpation of the frontal sinus. Right Ear: Tympanic membrane normal.      Left Ear: Tympanic membrane normal.      Nose: Nose normal.      Mouth/Throat:      Mouth: Mucous membranes are moist.      Pharynx: Oropharynx is clear. Eyes:      Conjunctiva/sclera: Conjunctivae normal.      Pupils: Pupils are equal, round, and reactive to light. Cardiovascular:      Rate and Rhythm: Normal rate and regular rhythm. Pulmonary:      Effort: Pulmonary effort is normal. No respiratory distress. Breath sounds: Normal breath sounds. Abdominal:      General: Bowel sounds are normal.      Palpations: Abdomen is soft. Tenderness: There is no abdominal tenderness. Musculoskeletal:         General: Normal range of motion. Cervical back: Normal range of motion. No rigidity. Lymphadenopathy:      Cervical: No cervical adenopathy. Skin:     General: Skin is warm and dry. Neurological:      General: No focal deficit present. Mental Status: He is alert and oriented to person, place, and time. Mental status is at baseline. Psychiatric:         Mood and Affect: Mood normal.         Behavior: Behavior normal.         Thought Content: Thought content normal.         Judgment: Judgment normal.         Testing:           Medical Decision Making:     Patient upon arrival did not appear toxic or lethargic. Vital signs were reviewed. Past medical history reviewed. Allergies reviewed. Medications reviewed. Patient is presenting with the above complaint of body aches, fatigue and sinus congestion. Differential diagnosis was discussed with the patient.   Patient will be given a prescription for cefdinir and Medrol Dosepak. Patient may use over-the-counter analgesics and decongestants as needed. Patient is continue to monitor symptoms and follow-up with their primary care provider in 3-5 days if no improvement. Patient will return or go to the emergency department for any worsening symptoms. Patient understands the plan is agreeable. Addendum: The pharmacy states they do not have cefdinir and it is on backorder. Given his list of allergies we will choose Levaquin in its place. The prescription was sent to his pharmacy. Clinical Impression:   Constance Rico was seen today for sinus problem. Diagnoses and all orders for this visit:    Acute non-recurrent sinusitis, unspecified location    Other orders  -     cefdinir (OMNICEF) 300 MG capsule; Take 1 capsule by mouth 2 times daily for 10 days  -     methylPREDNISolone (MEDROL DOSEPACK) 4 MG tablet; Take by mouth. The patient is to call for any concerns or return if any of the signs or symptoms worsen. The patient is to follow-up with PCP in the next 2-3 days for repeat evaluation repeat assessment or go directly to the emergency department. SIGNATURE: Melody Gaytan PA-C

## 2023-04-06 ENCOUNTER — OFFICE VISIT (OUTPATIENT)
Dept: FAMILY MEDICINE CLINIC | Age: 78
End: 2023-04-06
Payer: MEDICARE

## 2023-04-06 VITALS
OXYGEN SATURATION: 95 % | DIASTOLIC BLOOD PRESSURE: 76 MMHG | SYSTOLIC BLOOD PRESSURE: 122 MMHG | HEIGHT: 72 IN | BODY MASS INDEX: 29.66 KG/M2 | HEART RATE: 79 BPM | RESPIRATION RATE: 18 BRPM | TEMPERATURE: 97.8 F | WEIGHT: 219 LBS

## 2023-04-06 DIAGNOSIS — J01.10 ACUTE NON-RECURRENT FRONTAL SINUSITIS: Primary | ICD-10-CM

## 2023-04-06 PROCEDURE — 3074F SYST BP LT 130 MM HG: CPT

## 2023-04-06 PROCEDURE — 1123F ACP DISCUSS/DSCN MKR DOCD: CPT

## 2023-04-06 PROCEDURE — 99213 OFFICE O/P EST LOW 20 MIN: CPT

## 2023-04-06 PROCEDURE — 3078F DIAST BP <80 MM HG: CPT

## 2023-04-06 RX ORDER — CEFDINIR 300 MG/1
300 CAPSULE ORAL 2 TIMES DAILY
Qty: 20 CAPSULE | Refills: 0 | Status: SHIPPED | OUTPATIENT
Start: 2023-04-06 | End: 2023-04-16

## 2023-04-06 NOTE — PROGRESS NOTES
effects discussed. Follow-up with PCP in 7 to 10 days. Red flag symptoms were discussed with the patient including high or refractory fever, progressive SOB, dyspnea, CP, calf pain/swelling, shaking chills, vomiting, abdominal pain, lethargy, flank pain, or decreased urinary output. If any of these occur, they should go immediately to the emergency department for further evaluation. All questions were answered. The patient relies understanding and was agreeable to the above plan. Pedrito Chester, APRN - CNP    *NOTE: This report was transcribed using voice recognition software. Every effort was made to ensure accuracy; however, inadvertent computerized transcription errors may be present.

## 2023-08-10 ENCOUNTER — OFFICE VISIT (OUTPATIENT)
Dept: FAMILY MEDICINE CLINIC | Age: 78
End: 2023-08-10
Payer: MEDICARE

## 2023-08-10 VITALS
OXYGEN SATURATION: 96 % | HEIGHT: 72 IN | HEART RATE: 82 BPM | WEIGHT: 219 LBS | SYSTOLIC BLOOD PRESSURE: 132 MMHG | BODY MASS INDEX: 29.66 KG/M2 | TEMPERATURE: 97.8 F | DIASTOLIC BLOOD PRESSURE: 76 MMHG | RESPIRATION RATE: 18 BRPM

## 2023-08-10 DIAGNOSIS — J01.90 ACUTE NON-RECURRENT SINUSITIS, UNSPECIFIED LOCATION: Primary | ICD-10-CM

## 2023-08-10 PROCEDURE — 3078F DIAST BP <80 MM HG: CPT | Performed by: NURSE PRACTITIONER

## 2023-08-10 PROCEDURE — 99213 OFFICE O/P EST LOW 20 MIN: CPT | Performed by: NURSE PRACTITIONER

## 2023-08-10 PROCEDURE — 3075F SYST BP GE 130 - 139MM HG: CPT | Performed by: NURSE PRACTITIONER

## 2023-08-10 PROCEDURE — 1123F ACP DISCUSS/DSCN MKR DOCD: CPT | Performed by: NURSE PRACTITIONER

## 2023-08-10 RX ORDER — CEFDINIR 300 MG/1
300 CAPSULE ORAL 2 TIMES DAILY
Qty: 20 CAPSULE | Refills: 0 | Status: SHIPPED | OUTPATIENT
Start: 2023-08-10 | End: 2023-08-20

## 2025-09-06 ENCOUNTER — OFFICE VISIT (OUTPATIENT)
Dept: FAMILY MEDICINE CLINIC | Age: 80
End: 2025-09-06
Payer: MEDICARE

## 2025-09-06 VITALS
TEMPERATURE: 97.8 F | OXYGEN SATURATION: 97 % | DIASTOLIC BLOOD PRESSURE: 80 MMHG | WEIGHT: 222 LBS | SYSTOLIC BLOOD PRESSURE: 142 MMHG | HEIGHT: 72 IN | BODY MASS INDEX: 30.07 KG/M2 | HEART RATE: 83 BPM

## 2025-09-06 DIAGNOSIS — B96.89 ACUTE BACTERIAL SINUSITIS: Primary | ICD-10-CM

## 2025-09-06 DIAGNOSIS — J01.90 ACUTE BACTERIAL SINUSITIS: Primary | ICD-10-CM

## 2025-09-06 DIAGNOSIS — R68.83 CHILLS: ICD-10-CM

## 2025-09-06 DIAGNOSIS — R51.9 NONINTRACTABLE HEADACHE, UNSPECIFIED CHRONICITY PATTERN, UNSPECIFIED HEADACHE TYPE: ICD-10-CM

## 2025-09-06 LAB
Lab: NORMAL
PERFORMING INSTRUMENT: NORMAL
QC PASS/FAIL: NORMAL
SARS-COV-2, POC: NORMAL

## 2025-09-06 PROCEDURE — 99213 OFFICE O/P EST LOW 20 MIN: CPT | Performed by: STUDENT IN AN ORGANIZED HEALTH CARE EDUCATION/TRAINING PROGRAM

## 2025-09-06 PROCEDURE — 3077F SYST BP >= 140 MM HG: CPT | Performed by: STUDENT IN AN ORGANIZED HEALTH CARE EDUCATION/TRAINING PROGRAM

## 2025-09-06 PROCEDURE — 3079F DIAST BP 80-89 MM HG: CPT | Performed by: STUDENT IN AN ORGANIZED HEALTH CARE EDUCATION/TRAINING PROGRAM

## 2025-09-06 PROCEDURE — 1159F MED LIST DOCD IN RCRD: CPT | Performed by: STUDENT IN AN ORGANIZED HEALTH CARE EDUCATION/TRAINING PROGRAM

## 2025-09-06 PROCEDURE — 87426 SARSCOV CORONAVIRUS AG IA: CPT | Performed by: STUDENT IN AN ORGANIZED HEALTH CARE EDUCATION/TRAINING PROGRAM

## 2025-09-06 PROCEDURE — 1123F ACP DISCUSS/DSCN MKR DOCD: CPT | Performed by: STUDENT IN AN ORGANIZED HEALTH CARE EDUCATION/TRAINING PROGRAM

## 2025-09-06 RX ORDER — CEFDINIR 300 MG/1
300 CAPSULE ORAL 2 TIMES DAILY
Qty: 20 CAPSULE | Refills: 0 | Status: SHIPPED | OUTPATIENT
Start: 2025-09-06 | End: 2025-09-16

## 2025-09-06 RX ORDER — METHYLPREDNISOLONE 4 MG/1
TABLET ORAL
Qty: 1 KIT | Refills: 0 | Status: SHIPPED | OUTPATIENT
Start: 2025-09-06